# Patient Record
Sex: FEMALE | Race: BLACK OR AFRICAN AMERICAN | URBAN - METROPOLITAN AREA
[De-identification: names, ages, dates, MRNs, and addresses within clinical notes are randomized per-mention and may not be internally consistent; named-entity substitution may affect disease eponyms.]

---

## 2019-06-07 ENCOUNTER — HISTORICAL (OUTPATIENT)
Dept: RADIOLOGY | Facility: HOSPITAL | Age: 29
End: 2019-06-07

## 2019-06-11 ENCOUNTER — HISTORICAL (OUTPATIENT)
Dept: RADIOLOGY | Facility: HOSPITAL | Age: 29
End: 2019-06-11

## 2019-07-09 ENCOUNTER — HISTORICAL (OUTPATIENT)
Dept: RADIOLOGY | Facility: HOSPITAL | Age: 29
End: 2019-07-09

## 2019-08-05 LAB
ABS NEUT (OLG): 0.8 X10(3)/MCL (ref 1.5–6.9)
ALBUMIN SERPL-MCNC: 3.8 GM/DL (ref 3.4–5)
ALBUMIN/GLOB SERPL: 1.1 RATIO
ALP SERPL-CCNC: 72 UNIT/L (ref 30–113)
ALT SERPL-CCNC: 9 UNIT/L (ref 10–45)
ANISOCYTOSIS BLD QL SMEAR: ABNORMAL
APTT PPP: 26.6 SECOND(S) (ref 25–35)
AST SERPL-CCNC: 13 UNIT/L (ref 15–37)
BASOPHILS NFR BLD MANUAL: 0 % (ref 0–1)
BILIRUB SERPL-MCNC: 0.3 MG/DL (ref 0.1–0.9)
BILIRUBIN DIRECT+TOT PNL SERPL-MCNC: 0.1 MG/DL (ref 0–0.3)
BILIRUBIN DIRECT+TOT PNL SERPL-MCNC: 0.2 MG/DL
BUN SERPL-MCNC: 4 MG/DL (ref 10–20)
CALCIUM SERPL-MCNC: 9.1 MG/DL (ref 8–10.5)
CHLORIDE SERPL-SCNC: 105 MMOL/L (ref 100–108)
CO2 SERPL-SCNC: 26 MMOL/L (ref 21–35)
CREAT SERPL-MCNC: 0.88 MG/DL (ref 0.7–1.3)
EOSINOPHIL NFR BLD MANUAL: 6 % (ref 0–5)
ERYTHROCYTE [DISTWIDTH] IN BLOOD BY AUTOMATED COUNT: 17.1 % (ref 11.5–17)
GLOBULIN SER-MCNC: 3.5 GM/DL
GLUCOSE SERPL-MCNC: 86 MG/DL (ref 75–116)
GRANULOCYTES NFR BLD MANUAL: 28 % (ref 47–80)
HCT VFR BLD AUTO: 30.5 % (ref 36–48)
HGB BLD-MCNC: 9.9 GM/DL (ref 12–16)
HYPOCHROMIA BLD QL SMEAR: ABNORMAL
INR PPP: 1.1 (ref 0–1.2)
LYMPHOCYTES NFR BLD MANUAL: 63 % (ref 15–40)
MCH RBC QN AUTO: 23 PG (ref 27–34)
MCHC RBC AUTO-ENTMCNC: 32 GM/DL (ref 31–36)
MCV RBC AUTO: 72 FL (ref 80–99)
MICROCYTES BLD QL SMEAR: ABNORMAL
MONOCYTES NFR BLD MANUAL: 3 % (ref 4–12)
PLATELET # BLD AUTO: 372 X10(3)/MCL (ref 140–400)
PLATELET # BLD EST: ADEQUATE 10*3/UL
PMV BLD AUTO: 8.7 FL (ref 6.8–10)
POTASSIUM SERPL-SCNC: 4 MMOL/L (ref 3.6–5.2)
PROT SERPL-MCNC: 7.3 GM/DL (ref 6.4–8.2)
PROTHROMBIN TIME: 11.2 SECOND(S) (ref 9–12)
RBC # BLD AUTO: 4.26 X10(6)/MCL (ref 4.2–5.4)
RBC MORPH BLD: ABNORMAL
SODIUM SERPL-SCNC: 139 MMOL/L (ref 135–145)
WBC # SPEC AUTO: 4.2 X10(3)/MCL (ref 4.5–11.5)

## 2019-08-08 ENCOUNTER — HISTORICAL (OUTPATIENT)
Dept: MEDSURG UNIT | Facility: HOSPITAL | Age: 29
End: 2019-08-08

## 2019-08-08 LAB — B-HCG SERPL QL: NEGATIVE

## 2019-08-09 LAB
ABS NEUT (OLG): 10.9 X10(3)/MCL (ref 1.5–6.9)
ALBUMIN SERPL-MCNC: 3.1 GM/DL (ref 3.4–5)
ALBUMIN/GLOB SERPL: 1 RATIO
ALP SERPL-CCNC: 57 UNIT/L (ref 30–113)
ALT SERPL-CCNC: 10 UNIT/L (ref 10–45)
AST SERPL-CCNC: 16 UNIT/L (ref 15–37)
BASOPHILS # BLD AUTO: 0 X10(3)/MCL (ref 0–0.1)
BASOPHILS NFR BLD AUTO: 0 % (ref 0–1)
BILIRUB SERPL-MCNC: 0.2 MG/DL (ref 0.1–0.9)
BILIRUBIN DIRECT+TOT PNL SERPL-MCNC: 0.1 MG/DL
BILIRUBIN DIRECT+TOT PNL SERPL-MCNC: 0.1 MG/DL (ref 0–0.3)
BUN SERPL-MCNC: 4 MG/DL (ref 10–20)
CALCIUM SERPL-MCNC: 8.3 MG/DL (ref 8–10.5)
CHLORIDE SERPL-SCNC: 106 MMOL/L (ref 100–108)
CO2 SERPL-SCNC: 27 MMOL/L (ref 21–35)
CREAT SERPL-MCNC: 0.71 MG/DL (ref 0.7–1.3)
ERYTHROCYTE [DISTWIDTH] IN BLOOD BY AUTOMATED COUNT: 16.5 % (ref 11.5–17)
GLOBULIN SER-MCNC: 3.2 GM/DL
GLUCOSE SERPL-MCNC: 95 MG/DL (ref 75–116)
HCT VFR BLD AUTO: 26.6 % (ref 36–48)
HGB BLD-MCNC: 8.6 GM/DL (ref 12–16)
HYPOCHROMIA BLD QL SMEAR: ABNORMAL
IMM GRANULOCYTES # BLD AUTO: 0.05 10*3/UL (ref 0–0.02)
IMM GRANULOCYTES NFR BLD AUTO: 0.4 % (ref 0–0.43)
LYMPHOCYTES # BLD AUTO: 1 X10(3)/MCL (ref 0.5–4.1)
LYMPHOCYTES NFR BLD AUTO: 8 % (ref 15–40)
MACROCYTES BLD QL SMEAR: ABNORMAL
MAGNESIUM SERPL-MCNC: 1.3 MG/DL (ref 1.8–2.4)
MCH RBC QN AUTO: 23 PG (ref 27–34)
MCHC RBC AUTO-ENTMCNC: 32 GM/DL (ref 31–36)
MCV RBC AUTO: 71 FL (ref 80–99)
MICROCYTES BLD QL SMEAR: ABNORMAL
MONOCYTES # BLD AUTO: 0.3 X10(3)/MCL (ref 0–1.1)
MONOCYTES NFR BLD AUTO: 3 % (ref 4–12)
NEUTROPHILS # BLD AUTO: 10.9 X10(3)/MCL (ref 1.5–6.9)
NEUTROPHILS NFR BLD AUTO: 89 % (ref 43–75)
OVALOCYTES BLD QL SMEAR: ABNORMAL
PHOSPHATE SERPL-MCNC: 3.2 MG/DL (ref 2.6–4.7)
PLATELET # BLD AUTO: 292 X10(3)/MCL (ref 140–400)
PLATELET # BLD EST: ADEQUATE 10*3/UL
PMV BLD AUTO: 8.6 FL (ref 6.8–10)
POTASSIUM SERPL-SCNC: 3.8 MMOL/L (ref 3.6–5.2)
PROT SERPL-MCNC: 6.3 GM/DL (ref 6.4–8.2)
RBC # BLD AUTO: 3.75 X10(6)/MCL (ref 4.2–5.4)
RBC MORPH BLD: ABNORMAL
SODIUM SERPL-SCNC: 140 MMOL/L (ref 135–145)
TARGETS BLD QL SMEAR: ABNORMAL
WBC # SPEC AUTO: 12.3 X10(3)/MCL (ref 4.5–11.5)

## 2020-01-07 LAB
CLARITY, POC UA: CLEAR
COLOR, POC UA: YELLOW
GLUCOSE UR QL STRIP: NEGATIVE
LEUKOCYTE EST, POC UA: NEGATIVE
NITRITE, POC UA: NEGATIVE
PROTEIN, POC: NEGATIVE

## 2020-02-10 ENCOUNTER — HISTORICAL (OUTPATIENT)
Dept: ADMINISTRATIVE | Facility: HOSPITAL | Age: 30
End: 2020-02-10

## 2020-04-06 ENCOUNTER — HISTORICAL (OUTPATIENT)
Dept: ADMINISTRATIVE | Facility: HOSPITAL | Age: 30
End: 2020-04-06

## 2022-04-10 ENCOUNTER — HISTORICAL (OUTPATIENT)
Dept: ADMINISTRATIVE | Facility: HOSPITAL | Age: 32
End: 2022-04-10

## 2022-04-28 VITALS
DIASTOLIC BLOOD PRESSURE: 78 MMHG | WEIGHT: 132.25 LBS | HEIGHT: 63 IN | BODY MASS INDEX: 23.43 KG/M2 | SYSTOLIC BLOOD PRESSURE: 114 MMHG

## 2022-04-29 NOTE — OP NOTE
REFERRING PHYSICIAN:  China Jerry NP    ADMITTING DIAGNOSIS:  Left thyroid mass x7 months.    PROCEDURE:  Removal of left thyroid mass with a left thyroid lobectomy.    POSTOPERATIVE DIAGNOSIS:  Successful left thyroid lobectomy.    INDICATIONS:  The patient is a 29-year-old  female with a left thyroid mass present for 7 months, referred to me by Ms. China Jerry for evaluation and workup.  Ultrasound showed a solid and cystic component to the mass on the left side on 06/07/2019.  Thyroid scan showed a left-sided hot nodule.  Her TSH was 0.19 on 06/12/2019 and 05/15/2019 respectively.  The patient needed a left thyroid lobectomy after FNA revealed nodular hyperplasia.  The lump was obvious.  She was uncomfortable with it and wanted it removed.  She had denied any trouble swallowing.    PROCEDURE IN DETAIL:  The patient was brought to the operating room, neck in full extension.  Had a collar incision made and dissection after general anesthetic and being prepped and draped in a sterile fashion.  The patient underwent dissection of the skin, subcutaneous tissue.  Platysma was opened and the precervical fascia was .  Patient had the strap muscles exposed and midline retraction of the strap muscles was also accomplished.  Patient underwent mobilization of the thyroid, left thyroid lobe.  A large cystic solid mass was evacuated, it had some white discoloration to it.  The patient underwent isolation of the upper and lower thyroid artery and vein and then the middle thyroid vein was clipped.  The patient had the recurrent laryngeal nerve clearly identified and avoided.  The parathyroid glands were also identified and avoided.  Thyroid gland was cauterized off the trachea.  No problems were encountered.  Patient tolerated the procedure well.  Surgicel was left for hemostasis.  No significant bleeding was noted.  The patient had strap muscles closed with 3-0 Vicryl.  The platysma  was closed with 3-0 Vicryl.  Skin was closed with 4-0 plain gut suture and Dermabond.  Sterile dressings were applied.  No problems were encountered.  Patient tolerated the procedure well.       I appreciate the consultation referral from her nurse practitioner, Ms. China Jerry.  Will notify of my findings.        RASHEED/ISAAC   DD: 08/08/2019 1105   DT: 08/08/2019 1116  Job # [id number]    cc: China Jerry NP

## 2022-09-21 ENCOUNTER — HISTORICAL (OUTPATIENT)
Dept: ADMINISTRATIVE | Facility: HOSPITAL | Age: 32
End: 2022-09-21